# Patient Record
(demographics unavailable — no encounter records)

---

## 2024-10-23 NOTE — ASSESSMENT
[FreeTextEntry1] : Patient is a 76 F with history of T2D, CAD (w 2 stents) HTN, HLD, GERD, Anemia, hyperthyroidism, thyroid nodules here for follow up.  # T2D - HgB A1C: 7.4-->9.5--> 7.0---> 4/25/24: 9.3%--> 9.1 8/30/2024 Spike downloaded and interpreted: Target 38%, high 45%, very high 17%, GMI 8.1%  Plan: -Reduce Trulicity to 1.5 mg once weekly due to side effects  - Continue Metformin ER 1 gram BID - Increase Basaglar to 14 units at bedtime -Repaglinide take ONE 1 mg tablet before each meal.  -Call me with highs or lows.  I discussed with the patient that because we are reducing the Trulicity that I would expect that she might need a little bit more insulin.  She verbalizes understanding.  - Complications: retinopathy, CAD - Aspirin: no - Most recent urine microalbumin NORMAL 10/2023 ACE-I/ARB: no - Most recent LDL:  1/12/2024. On statin: yes, atorvastatin 80 mg daily - Opthalmology up to date: yes , advised for yearly check up - Podiatry up to date: no advised for yearly check up - Patient to call for persistent glucose < 70 or > 300 - Patient counseled on the importance of consistent carbohydrate diet and regular physical activity - Advised patient to continue checking FSG and to bring meter to all visits - Symptoms of hypoglycemia discussed - Recommend checking finger stick at least 2-3 times per day fasting and before dinner or bed time. Spike 3 inserted by me today and patient instructed to do it at home in the future. I stressed the importance of close monitoring of her glucose due to hypoglycemia   # HTN - BP today 136/70 mmHg - Continue with Amlodipine 5 mg daily  - ACR negative  # HLD -  01/12/2024, goal <70 - Continue with atorvastatin 80 mg daily - Did not start zetia  - Repeat lipid profile next visit.  - On atorvastatin 80 mg daily currently and not taking Ezetimibe 10 mg daily.   # Hyperthyroidism presumed due to toxic nodule - Diagnosed with hyperthyroidism a few years ago. Was on MMI, now off. - TFT wnl, repeat today  - Clinically euthyroid   # Thyroid nodules - Thyroid ultrasound 10/15/2019 shows: BRITTON 0.6x0.4x0.7 cm hypoechoic nodule LML 2.4x1.2x1.5 cm nodule - Had FNA in 2020 at [facility] and report scanned into the system and is benign. Thyroid ultrasound 01/2023 shows: RML 1 cm heterogenous nodule L posterior 2.3 cm heterogeneous nodule or eccentric parenchyma - L nodule stable in size, repeat thyroid ultrasound in 01/2024 - Repeat ultrasound ordered today   # Vitamin D deficiency - Last vitamin D level 26.8, repeat vitamin D level  - Continue with vitamin D 800 IU daily  #Bone Density: DEXA 1/2025 same day as appt with Jinny.   Back up pharmacy for [facility]: [facility] [id number] [place] [address] [phone]  Patient wishes to do labs at the next visit.
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
## 2024-10-24 NOTE — HISTORY OF PRESENT ILLNESS
[FreeTextEntry1] : Recently admitted to [facility] for SOB. Was diagnosed with orthostatic hypotension and CHF. Currently doing okay. Denies chest pain, shortness of breath or palpitations. Stumped her toe recently.

## 2024-10-24 NOTE — DISCUSSION/SUMMARY
[Coronary Artery Disease] : coronary artery disease [Hypertension] : hypertension [Stable] : stable [Low Sodium Diet] : low sodium diet

## 2024-10-24 NOTE — HISTORY OF PRESENT ILLNESS
[FreeTextEntry1] : Recently admitted to [facility] for SOB. Was diagnosed with orthostatic hypotension and CHF. Currently doing okay. Denies chest pain, shortness of breath or palpitations. Stumped her toe recently.

## 2025-02-13 NOTE — ADDENDUM
[FreeTextEntry1] : Instructions given to patient: Plan:  See eye doctor ASAP.   Schedule to see dentist for tooth extractions.       Diabetes:   Continue Trulicity 1.5 mg once weekly   Continue metformin  mg 2 tablets twice daily   Increase Lantus from 14 units daily to 16 units daily.   Restart repaglinide at 0.5 mg 1 tablet prior to each substantial meal.   In order for you to identify the repaglinide bottle please put identifying tactile markers on it so that you do not confuse it with other medications.      Bone Health:   Your fracture score risk is not high enough to treat you yet so I will recommend that we repeat a bone density in 1 year from this bone density.      I am referring you to a geriatric specialist Dr. Savanah Evangelista.      I am providing you with the patient assistance program application that you can fill out the next time you see your daughter and this will be for Basaglar insulin.
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
## 2025-02-27 NOTE — CARDIOLOGY SUMMARY
[de-identified] : 02/27/25 - normal sinus rhythm [de-identified] : 09/27/24 - no significant valvular disease, normal LA, normal LV and RV size and function, PASP 22 mmHg, LVEF 67%

## 2025-02-27 NOTE — DISCUSSION/SUMMARY
[Coronary Artery Disease] : coronary artery disease [Hypertension] : hypertension [Stable] : stable [Low Sodium Diet] : low sodium diet [FreeTextEntry1] : Currently stable from a cardiovascular standpoint. Normotensive. Euvolemic. Stable CAD (stents in 2006). No ischemic or CHF symptoms. Continue current medications including aspirin, metoprolol succinate, and atorvastatin. ECG completed today and reviewed (findings as noted above). Follow up in 6 months. [EKG obtained to assist in diagnosis and management of assessed problem(s)] : EKG obtained to assist in diagnosis and management of assessed problem(s)

## 2025-05-19 NOTE — HISTORY OF PRESENT ILLNESS
[0] : 2) Feeling down, depressed, or hopeless: Not at all (0) [PHQ-2 Negative - No further assessment needed] : PHQ-2 Negative - No further assessment needed [No falls in past year] : Patient reported no falls in the past year [] : shopping [With Patient/Caregiver] : , with patient/caregiver [FreeTextEntry1] : PCP Dr. Emilia Vaca [facility] physician Phone [phone] Fax [phone] [address] [place] [address] Endo: Malika Tobar Cardiologist: Patrice Anderson  Ms. MCKENZIE GUZMAN is a 76 year old woman with pmhx of HTN, HLD, T2DM with retinopathy, CAD, goiter, hyperthyroidism, osteoporosis, vertigo who presents for a geriatric assessment.   She was late due to taking the bus, and walking inthe wrong direction. She atributes this to her blood pressure being elevated today. She had problems with medicare, but now insurance is back up. She due for blood work and other health screening. [facility] for blood test today and referrals. Refills for some medications provided today.   She declined pneumonia vaccines, states she had this with pcp. records to be faxed over.   She follows with cardiologist and endocrinologist in [facility], recent notes, labs reviewed today with patient.   Patient is concerned about forgetfullness. No family hx of memory problems.  She has no other complaints.  [[id number]] : 0 [AdvancecareDate] : 05/25 [FreeTextEntry4] : has HCP, copy to sent over.

## 2025-05-19 NOTE — PHYSICAL EXAM
[Alert] : alert [No Acute Distress] : in no acute distress [Sclera] : the sclera and conjunctiva were normal [Normal Outer Ear/Nose] : the ears and nose were normal in appearance [Supple] : the neck was supple [No Respiratory Distress] : no respiratory distress [No Acc Muscle Use] : no accessory muscle use [Respiration, Rhythm And Depth] : normal respiratory rhythm and effort [Auscultation Breath Sounds / Voice Sounds] : lungs were clear to auscultation bilaterally [Normal S1, S2] : normal S1 and S2 [Heart Rate And Rhythm] : heart rate was normal and rhythm regular [Bowel Sounds] : normal bowel sounds [Abdomen Tenderness] : non-tender [Abdomen Soft] : soft [No Clubbing, Cyanosis] : no clubbing or cyanosis of the fingernails [Motor Tone] : muscle strength and tone were normal [Normal Color / Pigmentation] : normal skin color and pigmentation [No Focal Deficits] : no focal deficits [Normal Affect] : the affect was normal [Normal Mood] : the mood was normal [Normal Gait] : abnormal gait
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
## 2025-05-19 NOTE — ASSESSMENT
[FreeTextEntry1] : Forgertfullness - worsening over the past year. Minicog 0/5, clockdrawing and 3 word recall impaired. CT head 06/2024: Parenchymal volume loss and chronic microvessel ischemic changes are identified. There is no acute hemorrhage mass or mass effect seen.   She is claustrophobic, and has not been able to due to MRI. We discussed open MRI of the brain. Advised furthter testing in next appointment cognitive evaluation, she will bring her daughter. No family hx of neurodegenerative disease.   Weight loss apprecaited. encourage finishing meals. adding daily multivitamin.   Advance care planning:  Two children. Son (John) and Francesca (daughter), assigned daughter as HCP. copy to be sent over.   HTN - mildly elevated blood pressure today. likely situational. c/w amlodipine.   HM - Continue healthy meals and snacks, and  physical activity as tolerated for weight maintenance. Vaccine information in chart. Orders and referral provided as below. Patient is here for episodic care. All patient questions answered today and understood by patient. Henceforth, Patient to schedule follow up appointments to discuss results and update plan of care, and if new symptoms, questions, renewals or health concerns. Patient to access results done in a [facility] in the patient portal for review.

## 2025-05-19 NOTE — ASSESSMENT
[FreeTextEntry1] : Forgertfullness - worsening over the past year. Minicog 0/5, clockdrawing and 3 word recall impaired. CT head 06/2024: Parenchymal volume loss and chronic microvessel ischemic changes are identified. There is no acute hemorrhage mass or mass effect seen.   She is claustrophobic, and has not been able to due to MRI. We discussed open MRI of the brain. Advised furthter testing in next appointment cognitive evaluation, she will bring her daughter. No family hx of neurodegenerative disease.   Weight loss apprecaited. encourage finishing meals. adding daily multivitamin.   Advance care planning:  Two children. Son (John) and Francesca (daughter), assigned daughter as HCP. copy to be sent over.   HTN - mildly elevated blood pressure today. likely situational. c/w amlodipine.   HM - Continue healthy meals and snacks, and  physical activity as tolerated for weight maintenance. Vaccine information in chart. Orders and referral provided as below. Patient is here for episodic care. All patient questions answered today and understood by patient. Henceforth, Patient to schedule follow up appointments to discuss results and update plan of care, and if new symptoms, questions, renewals or health concerns. Patient to access results done in a [facility] in the patient portal for review.

## 2025-05-29 NOTE — PHYSICAL EXAM
[Alert] : alert [Normal Voice/Communication] : normal voice/communication [Healthy Appearing] : healthy appearing [No Acute Distress] : no acute distress [Sclera] : the sclera and conjunctiva were normal [Hearing Threshold Finger Rub Not [place]] : hearing was normal [Normal Lips/Gums] : the lips and gums were normal [Oropharynx] : the oropharynx was normal [Normal Appearance] : the appearance of the neck was normal [No Neck Mass] : no neck mass was observed [No Respiratory Distress] : no respiratory distress [No Acc Muscle Use] : no accessory muscle use [Respiration, Rhythm And Depth] : normal respiratory rhythm and effort [Heart Rate And Rhythm] : heart rate was normal and rhythm regular [Auscultation Breath Sounds / Voice Sounds] : lungs were clear to auscultation bilaterally [Normal S1, S2] : normal S1 and S2 [Murmurs] : no murmurs [Bowel Sounds] : normal bowel sounds [No Masses] : no abdominal mass palpated [Abdomen Soft] : soft [] : no hepatosplenomegaly [Oriented To Time, Place, And Person] : oriented to person, place, and time [Periumbilical] : in the periumbilical area [Epigastric] : epigastric [Other: ___] : [unfilled] [Rebound Tenderness] : no rebound tenderness [Rebound] : no rebound [Guarding] : no guarding [de-identified] : Periumbilical and epigastric tenderness, no guarding or rebound

## 2025-05-29 NOTE — REASON FOR VISIT
[Initial Evaluation] : an initial evaluation [FreeTextEntry1] : Periumbilical Abdominal Pain, Epigastric Abdominal Pain, Nausea

## 2025-05-29 NOTE — REVIEW OF SYSTEMS
[Negative] : Heme/Lymph [As Noted in HPI] : as noted in HPI [Belching] : belching [Abdominal Pain] : abdominal pain [FreeTextEntry7] : nausea

## 2025-05-29 NOTE — HISTORY OF PRESENT ILLNESS
[FreeTextEntry1] : Thank you for consult.  Patient is a 77 y/o female with a PMHx of CAD s/p cardiac cath with stent placement x 2 (2006), Type 2 Diabetes Mellitus with Diabetic Retinopathy, Anemia, Adrenal nodule, forgetfulness, osteoporosis, hypertension, hyperlipidemia, hyperthyroidism, Vertigo, Vitamin D Deficiency, and GERD s/p cholecystectomy, total abdominal hysterectomy, and cataract surgery, who presents c/o periumbilical and epigastric abdominal pain, excess belching, and postprandial nausea x > 1 year. Pt denies rectal bleeding. Pt denies any FHx of colon cancer. Pt reports that she was on OTC Prilosec in the past for her GERD, but she is no longer on it. As per pt, last colonoscopy was > 10 years ago at an external facility. Pt has never had an EGD.

## 2025-05-29 NOTE — ASSESSMENT
[FreeTextEntry1] : Pantoprazole 40 mg prescribed, instructed pt to take once daily in the morning. Referred pt for CT Abdomen and Pelvis, will f/u with results. Referred pt for EGD-Colonoscopy, will obtain cardiac clearance prior to EGD-Colonoscopy. Pt expressed understanding and agrees with the plan.  A low acid/reflux diet was discussed in great detail including not smoking, not drinking alcohol, and not consuming foods that irritate the esophagus. It is helpful to eat small meals throughout the day instead of large meals. You should avoid eating before bedtime or lying down after you eat. It can be helpful to raise the head of your bed six inches. Additionally, you should maintain a healthy weight and good posture. The patient was given written material to take home and review.  An upper GI endoscopy or EGD (esophagogastroduodenoscopy) is a procedure to diagnose and treat problems in your upper GI (gastrointestinal) tract.  The upper GI tract includes your food pipe (esophagus), stomach, and the first part of your small intestine (the duodenum).  This procedure is done using a long, flexible tube called an endoscope. The tube has a tiny light and video camera on one end. The tube is put into your mouth and throat. Then it is slowly pushed through your esophagus and stomach, and into your duodenum. Video images from the tube are seen on a monitor.  Small tools may also be inserted into the endoscope. These tools can be used to:  Take tissue samples for a biopsy Remove things such as food that may be stuck in the upper GI tract Inject air or fluid Stop bleeding do procedures such as endoscopic surgery, laser therapy, or open (dilate) a narrowed area  A colonoscopy is an exam of the lower part of the gastrointestinal tract, which is called the colon or large intestine (bowel). Colonoscopy is a safe procedure that provides information that other tests may not be able to give.  Colonoscopy is performed by inserting a device called a colonoscope into the anus and advancing through the entire colon. The procedure generally takes between 20 minutes and one hour.  Other tests that are sometimes used to screen for colon cancer, like virtual colonoscopy (also called CT colonography), were discussed separately.  REASONS FOR COLONOSCOPY:  The most common reasons for colonoscopy are:  1. To screen for colon polyps (growths of tissue in the colon) or colon cancer  2. Rectal bleeding  3. A change in bowel habits, like persistent diarrhea  4. Iron deficiency anemia (a decrease in blood count due to loss of iron)  5. A family history of colon cancer  6. A personal history of colon polyps or colon cancer  7. Chronic, unexplained abdominal or rectal pain  8. An abnormal X-Ray exam, like a barium enema or CT scan.   COLONOSCOPY PREPARATION: Before colonoscopy, your colon must be completely cleaned out so that the doctor can see any abnormal areas. This is vitally important to increase the chances that your doctor will identify abnormalities in your colon. If your colon is not completely cleaned out, the chances your doctor will miss abnormalities increases. Your doctor's office will provide specific instructions about how you should prepare for your colonoscopy. Be sure to read these instructions as soon as you get them so you will know how to take the preparation and whether you need to make any changes to your medications or diet. If you have questions, call the doctor's office in advance.  I spent 45 minutes with the patient as well as reviewing documents prior to and after the office visit. Patient verbalized understanding of all information provided. All questions answered and reviewed.  Ely Brooks NP

## 2025-06-09 NOTE — HISTORY OF PRESENT ILLNESS
[FreeTextEntry1] : Doing okay. Denies chest pain, shortness of breath or palpitations. Has abdominal pain which makes her want to vomit at times. Patient states that she is anticipating 2 endoscopic procedures including one this Wednesday.

## 2025-06-09 NOTE — DISCUSSION/SUMMARY
[Coronary Artery Disease] : coronary artery disease [Hypertension] : hypertension [Stable] : stable [Low Sodium Diet] : low sodium diet [FreeTextEntry1] : Currently stable from a cardiovascular standpoint. Normotensive. Euvolemic. Stable CAD (stents in 2006). No ischemic or CHF symptoms. Continue current medications including metoprolol succinate 50 mg daily and atorvastatin (not taking regularly). ECG completed today and reviewed (findings as noted above). Follow up in 6 months. At this time, patient is considered an acceptable risk from a cardiac standpoint for the anticipated endoscopy procedure(s). [EKG obtained to assist in diagnosis and management of assessed problem(s)] : EKG obtained to assist in diagnosis and management of assessed problem(s)

## 2025-06-09 NOTE — CARDIOLOGY SUMMARY
[de-identified] : 06/09/25 - normal sinus rhythm, nonspecific ST abnormality [de-identified] : 09/27/24 - no significant valvular disease, normal LA, normal LV and RV size and function, PASP 22 mmHg, LVEF 67%
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
